# Patient Record
Sex: MALE | Race: WHITE | NOT HISPANIC OR LATINO | Employment: FULL TIME | URBAN - METROPOLITAN AREA
[De-identification: names, ages, dates, MRNs, and addresses within clinical notes are randomized per-mention and may not be internally consistent; named-entity substitution may affect disease eponyms.]

---

## 2017-09-18 PROBLEM — K63.5 POLYP OF COLON: Status: ACTIVE | Noted: 2017-09-18

## 2018-07-11 PROBLEM — K63.5 COLON POLYPS: Status: ACTIVE | Noted: 2017-09-18

## 2018-08-04 ENCOUNTER — OFFICE VISIT (OUTPATIENT)
Dept: URGENT CARE | Facility: CLINIC | Age: 58
End: 2018-08-04
Payer: COMMERCIAL

## 2018-08-04 VITALS
HEIGHT: 76 IN | DIASTOLIC BLOOD PRESSURE: 94 MMHG | SYSTOLIC BLOOD PRESSURE: 155 MMHG | WEIGHT: 285 LBS | RESPIRATION RATE: 16 BRPM | OXYGEN SATURATION: 97 % | HEART RATE: 97 BPM | BODY MASS INDEX: 34.7 KG/M2 | TEMPERATURE: 101 F

## 2018-08-04 DIAGNOSIS — J06.9 VIRAL URI WITH COUGH: ICD-10-CM

## 2018-08-04 DIAGNOSIS — R05.9 COUGH: Primary | ICD-10-CM

## 2018-08-04 LAB
CTP QC/QA: YES
FLUAV AG NPH QL: NEGATIVE
FLUBV AG NPH QL: NEGATIVE

## 2018-08-04 PROCEDURE — 96372 THER/PROPH/DIAG INJ SC/IM: CPT | Mod: S$GLB,,, | Performed by: PHYSICIAN ASSISTANT

## 2018-08-04 PROCEDURE — 87804 INFLUENZA ASSAY W/OPTIC: CPT | Mod: QW,S$GLB,, | Performed by: PHYSICIAN ASSISTANT

## 2018-08-04 PROCEDURE — 99214 OFFICE O/P EST MOD 30 MIN: CPT | Mod: 25,S$GLB,, | Performed by: PHYSICIAN ASSISTANT

## 2018-08-04 RX ORDER — BETAMETHASONE SODIUM PHOSPHATE AND BETAMETHASONE ACETATE 3; 3 MG/ML; MG/ML
6 INJECTION, SUSPENSION INTRA-ARTICULAR; INTRALESIONAL; INTRAMUSCULAR; SOFT TISSUE
Status: COMPLETED | OUTPATIENT
Start: 2018-08-04 | End: 2018-08-04

## 2018-08-04 RX ORDER — FLUTICASONE PROPIONATE 50 MCG
2 SPRAY, SUSPENSION (ML) NASAL DAILY
Qty: 1 BOTTLE | Refills: 2 | Status: SHIPPED | OUTPATIENT
Start: 2018-08-04 | End: 2018-12-15

## 2018-08-04 RX ORDER — BENZONATATE 100 MG/1
100 CAPSULE ORAL EVERY 6 HOURS PRN
Qty: 60 CAPSULE | Refills: 1 | Status: SHIPPED | OUTPATIENT
Start: 2018-08-04 | End: 2018-12-15

## 2018-08-04 RX ADMIN — BETAMETHASONE SODIUM PHOSPHATE AND BETAMETHASONE ACETATE 6 MG: 3; 3 INJECTION, SUSPENSION INTRA-ARTICULAR; INTRALESIONAL; INTRAMUSCULAR; SOFT TISSUE at 04:08

## 2018-08-04 NOTE — PROGRESS NOTES
"Subjective:       Patient ID: Ayo Kimball is a 58 y.o. male.    Vitals:  height is 6' 3.5" (1.918 m) and weight is 129.3 kg (285 lb). His oral temperature is 101.1 °F (38.4 °C) (abnormal). His blood pressure is 155/94 (abnormal) and his pulse is 97. His respiration is 16 and oxygen saturation is 97%.     Chief Complaint: Sinus Problem    Sinus Problem   This is a new problem. The current episode started yesterday. The problem has been gradually worsening since onset. The maximum temperature recorded prior to his arrival was 101 - 101.9 F. Associated symptoms include chills, congestion (nasal), coughing, headaches and a sore throat. Pertinent negatives include no ear pain, hoarse voice or shortness of breath.     Review of Systems   Constitution: Positive for chills, decreased appetite and malaise/fatigue. Negative for fever.   HENT: Positive for congestion (nasal) and sore throat. Negative for ear pain and hoarse voice.    Eyes: Negative for discharge and redness.   Cardiovascular: Negative for chest pain, dyspnea on exertion and leg swelling.   Respiratory: Positive for cough. Negative for shortness of breath, sputum production and wheezing.    Musculoskeletal: Positive for myalgias.   Gastrointestinal: Negative for abdominal pain and nausea.   Neurological: Positive for headaches.       Objective:      Physical Exam   Constitutional: He is oriented to person, place, and time. He appears well-developed and well-nourished. He is cooperative.  Non-toxic appearance. He does not appear ill. No distress.   HENT:   Head: Normocephalic and atraumatic.   Right Ear: Hearing, tympanic membrane, external ear and ear canal normal.   Left Ear: Hearing, tympanic membrane, external ear and ear canal normal.   Nose: Nose normal. No mucosal edema, rhinorrhea or nasal deformity. No epistaxis. Right sinus exhibits no maxillary sinus tenderness and no frontal sinus tenderness. Left sinus exhibits no maxillary sinus tenderness and no " frontal sinus tenderness.   Mouth/Throat: Uvula is midline and mucous membranes are normal. No trismus in the jaw. Normal dentition. No uvula swelling. Posterior oropharyngeal erythema present.   Eyes: Conjunctivae and lids are normal. No scleral icterus.   Sclera clear bilat   Neck: Trachea normal, full passive range of motion without pain and phonation normal. Neck supple.   Cardiovascular: Normal rate, regular rhythm, normal heart sounds, intact distal pulses and normal pulses.    Pulmonary/Chest: Effort normal and breath sounds normal. No respiratory distress.   Abdominal: Soft. Normal appearance and bowel sounds are normal. He exhibits no distension. There is no tenderness.   Musculoskeletal: Normal range of motion. He exhibits no edema or deformity.   Neurological: He is alert and oriented to person, place, and time. He exhibits normal muscle tone. Coordination normal.   Skin: Skin is warm, dry and intact. He is not diaphoretic. No pallor.   Psychiatric: He has a normal mood and affect. His speech is normal and behavior is normal. Judgment and thought content normal. Cognition and memory are normal.   Nursing note and vitals reviewed.      Assessment:       1. Cough    2. Viral URI with cough        Plan:         Cough  -     POCT Influenza A/B    Viral URI with cough    Other orders  -     betamethasone acetate-betamethasone sodium phosphate injection 6 mg; Inject 1 mL (6 mg total) into the muscle one time.  -     fluticasone (FLONASE ALLERGY RELIEF) 50 mcg/actuation nasal spray; 2 sprays (100 mcg total) by Each Nare route once daily.  Dispense: 1 Bottle; Refill: 2  -     benzonatate (TESSALON PERLES) 100 MG capsule; Take 1 capsule (100 mg total) by mouth every 6 (six) hours as needed.  Dispense: 60 capsule; Refill: 1       I discussed with the patient the importance of follow up if their symptoms have not resolved in 1-2 week's time. Patient verbalized understanding and will RTC or go to the nearest ER if  symptoms persist or worsen.

## 2018-08-07 ENCOUNTER — TELEPHONE (OUTPATIENT)
Dept: URGENT CARE | Facility: CLINIC | Age: 58
End: 2018-08-07

## 2018-08-10 PROBLEM — J18.9 PNEUMONIA OF RIGHT LOWER LOBE DUE TO INFECTIOUS ORGANISM: Status: ACTIVE | Noted: 2018-08-10

## 2018-12-15 ENCOUNTER — OFFICE VISIT (OUTPATIENT)
Dept: URGENT CARE | Facility: CLINIC | Age: 58
End: 2018-12-15
Payer: COMMERCIAL

## 2018-12-15 VITALS
SYSTOLIC BLOOD PRESSURE: 139 MMHG | HEART RATE: 71 BPM | OXYGEN SATURATION: 98 % | TEMPERATURE: 98 F | DIASTOLIC BLOOD PRESSURE: 80 MMHG | RESPIRATION RATE: 14 BRPM

## 2018-12-15 DIAGNOSIS — R09.81 SINUS CONGESTION: Primary | ICD-10-CM

## 2018-12-15 PROCEDURE — 99214 OFFICE O/P EST MOD 30 MIN: CPT | Mod: 25,S$GLB,, | Performed by: PHYSICIAN ASSISTANT

## 2018-12-15 PROCEDURE — 96372 THER/PROPH/DIAG INJ SC/IM: CPT | Mod: S$GLB,,, | Performed by: FAMILY MEDICINE

## 2018-12-15 RX ORDER — PROMETHAZINE HYDROCHLORIDE AND DEXTROMETHORPHAN HYDROBROMIDE 6.25; 15 MG/5ML; MG/5ML
5 SYRUP ORAL NIGHTLY PRN
Qty: 180 ML | Refills: 1 | Status: SHIPPED | OUTPATIENT
Start: 2018-12-15 | End: 2018-12-25

## 2018-12-15 RX ORDER — BETAMETHASONE SODIUM PHOSPHATE AND BETAMETHASONE ACETATE 3; 3 MG/ML; MG/ML
6 INJECTION, SUSPENSION INTRA-ARTICULAR; INTRALESIONAL; INTRAMUSCULAR; SOFT TISSUE
Status: COMPLETED | OUTPATIENT
Start: 2018-12-15 | End: 2018-12-15

## 2018-12-15 RX ORDER — FLUTICASONE PROPIONATE 50 MCG
1 SPRAY, SUSPENSION (ML) NASAL 2 TIMES DAILY PRN
Qty: 1 BOTTLE | Refills: 1 | Status: SHIPPED | OUTPATIENT
Start: 2018-12-15 | End: 2019-08-02

## 2018-12-15 RX ORDER — BENZONATATE 200 MG/1
200 CAPSULE ORAL 3 TIMES DAILY PRN
Qty: 60 CAPSULE | Refills: 1 | Status: SHIPPED | OUTPATIENT
Start: 2018-12-15 | End: 2018-12-25

## 2018-12-15 RX ADMIN — BETAMETHASONE SODIUM PHOSPHATE AND BETAMETHASONE ACETATE 6 MG: 3; 3 INJECTION, SUSPENSION INTRA-ARTICULAR; INTRALESIONAL; INTRAMUSCULAR; SOFT TISSUE at 02:12

## 2018-12-15 NOTE — PROGRESS NOTES
Subjective:       Patient ID: Ayo Kimball is a 58 y.o. male.    Vitals:  oral temperature is 97.7 °F (36.5 °C). His blood pressure is 139/80 and his pulse is 71. His respiration is 14 and oxygen saturation is 98%.     Chief Complaint: Cough    Pt c/o productive cough, 1 day, nasal congestion, post nasal drip, sore throat, hoarseness, fever symptoms unmeasured at home, ear congestion, taking tylenol with mild relief, pt stated he was dx with pneumonia 3 months ago,       Cough   This is a new problem. The current episode started yesterday. The problem has been unchanged. The problem occurs constantly. The cough is productive of sputum. Associated symptoms include ear congestion, a fever, nasal congestion, postnasal drip and a sore throat. Pertinent negatives include no chills, ear pain, eye redness, hemoptysis, myalgias, rash, shortness of breath or wheezing. Treatments tried: Tylenol  The treatment provided mild relief. His past medical history is significant for pneumonia.       Constitution: Positive for fatigue and fever. Negative for chills and sweating.   HENT: Positive for congestion, postnasal drip and sore throat. Negative for ear pain, sinus pain, sinus pressure and voice change.    Neck: Negative for painful lymph nodes.   Eyes: Negative for eye redness.   Respiratory: Positive for cough and sputum production. Negative for chest tightness, bloody sputum, COPD, shortness of breath, stridor, wheezing and asthma.    Gastrointestinal: Negative for nausea and vomiting.   Musculoskeletal: Negative for muscle ache.   Skin: Negative for rash.   Allergic/Immunologic: Negative for seasonal allergies and asthma.   Hematologic/Lymphatic: Negative for swollen lymph nodes.       Objective:      Physical Exam   Constitutional: He is oriented to person, place, and time. He appears well-developed and well-nourished. He is cooperative.  Non-toxic appearance. He appears ill. No distress.   HENT:   Head: Normocephalic and  atraumatic.   Right Ear: Hearing, tympanic membrane, external ear and ear canal normal.   Left Ear: Hearing, tympanic membrane, external ear and ear canal normal.   Nose: Nose normal. No mucosal edema, rhinorrhea or nasal deformity. No epistaxis. Right sinus exhibits no maxillary sinus tenderness and no frontal sinus tenderness. Left sinus exhibits no maxillary sinus tenderness and no frontal sinus tenderness.   Mouth/Throat: Uvula is midline, oropharynx is clear and moist and mucous membranes are normal. No trismus in the jaw. Normal dentition. No uvula swelling. No posterior oropharyngeal erythema.   Eyes: Conjunctivae and lids are normal. No scleral icterus.   Sclera clear bilat   Neck: Trachea normal, full passive range of motion without pain and phonation normal. Neck supple.   Cardiovascular: Normal rate, regular rhythm, normal heart sounds, intact distal pulses and normal pulses.   Pulmonary/Chest: Effort normal and breath sounds normal. No respiratory distress.   Abdominal: Soft. Normal appearance and bowel sounds are normal. He exhibits no distension. There is no tenderness.   Musculoskeletal: Normal range of motion. He exhibits no edema or deformity.   Neurological: He is alert and oriented to person, place, and time. He exhibits normal muscle tone. Coordination normal.   Skin: Skin is warm, dry and intact. He is not diaphoretic. No pallor.   Psychiatric: He has a normal mood and affect. His speech is normal and behavior is normal. Judgment and thought content normal. Cognition and memory are normal.   Nursing note and vitals reviewed.      Assessment:       1. Sinus congestion        Plan:         Sinus congestion  -     betamethasone acetate-betamethasone sodium phosphate injection 6 mg  -     benzonatate (TESSALON) 200 MG capsule; Take 1 capsule (200 mg total) by mouth 3 (three) times daily as needed for Cough.  Dispense: 60 capsule; Refill: 1  -     fluticasone (FLONASE) 50 mcg/actuation nasal spray; 1  "spray (50 mcg total) by Each Nare route 2 (two) times daily as needed for Rhinitis or Allergies.  Dispense: 1 Bottle; Refill: 1  -     sodium chloride (OCEAN NASAL) 0.65 % nasal spray; 1 spray by Nasal route as needed for Congestion.  Dispense: 45 mL; Refill: 3  -     promethazine-dextromethorphan (PROMETHAZINE-DM) 6.25-15 mg/5 mL Syrp; Take 5 mLs by mouth nightly as needed.  Dispense: 180 mL; Refill: 1      Patient Instructions   NASAL ALLERGY    Nasal Allergy, also called "Allergic Rhinitis" occurs after exposure to pollen, molds, mildew, animal "dander" (scales from animal skin, hair and feathers), dust, smoke and fumes. (These are called "allergens"). When pollen causes a nasal allergy it is commonly called "Hay Fever".    When these particles contact the lining of the nose, eyes, eyelids, sinuses or throat, they cause the cells to release a chemical called "histamine". Histamine may cause a watery discharge from the eyes or nose. It may also cause violent sneezing, nasal congestion, itching of the eyes, nose, throat and mouth.    PREVENTION:    Nasal allergy cannot be cured but symptoms can be reduced. Avoid or reduce exposure to the allergen when possible.    HOME CARE:    1) DECONGESTANT pills and sprays (Sudafed, NeoSynephrine), reduce tissue swelling and watery discharge. Overuse of nasal decongestant sprays may make symptoms worse, ESPECIALLY IF YOU HAVE HIGH BLOOD PRESSURE. Do not use these more often than recommended. Get an over the counter Nasal Saline spray to supplement Flonase    2) ANTIHISTAMINES block the release of histamine during the allergic response. Antihistamines are more effective when taken BEFORE symptoms develop. Unless a prescription antihistamine was prescribed, you may take CLARITIN (loratadine). (Claritin is an over-the-counter antihistamine that does not cause drowsiness.)    3) STEROID nasal sprays (Beconase, Vancenase, Nasalide, Nasocort, Flonase) or oral steroids (Prednisone) " may also be prescribed for more severe symptoms. These help to reduce the local inflammation which adds to the allergic response.    4) If you have ASTHMA, pollen season may make your asthma symptoms worse. It is important that you use your asthma medicines as directed during this time to prevent or treat attacks. Some persons with asthma have a worsening of their asthma symptoms when taking antihistamines. If you notice this, stop the antihistamines and notify your doctor.    5) Consider a [place] Rhino Nasal and Sinus Rinse 2-3 times/week if your symptoms are chronic. ([email or web address]://[email or web address].[email or web address])     You received a steroid shot today - this can elevate your blood pressure, elevate your blood sugar, water weight gain, nervous energy, redness to the face and dimpling of the skin where the shot goes in.     If you were prescribed a narcotic medication, do not drive or operate heavy equipment or machinery while taking these medications.  You must understand that you've received an Urgent Care treatment only and that you may be released before all your medical problems are known or treated. You, the patient, will arrange for follow up care as instructed.  Follow up with your PCP or specialty clinic as directed in the next 1-2 weeks if not improved or as needed.  You can call [phone] to schedule an appointment with the appropriate provider.  If your condition worsens we recommend that you receive another evaluation at the emergency room immediately or contact your primary medical clinics after hours call service to discuss your concerns.  Please return here or go to the Emergency Department for any concerns or worsening of condition.

## 2018-12-15 NOTE — PATIENT INSTRUCTIONS
"NASAL ALLERGY    Nasal Allergy, also called "Allergic Rhinitis" occurs after exposure to pollen, molds, mildew, animal "dander" (scales from animal skin, hair and feathers), dust, smoke and fumes. (These are called "allergens"). When pollen causes a nasal allergy it is commonly called "Hay Fever".    When these particles contact the lining of the nose, eyes, eyelids, sinuses or throat, they cause the cells to release a chemical called "histamine". Histamine may cause a watery discharge from the eyes or nose. It may also cause violent sneezing, nasal congestion, itching of the eyes, nose, throat and mouth.    PREVENTION:    Nasal allergy cannot be cured but symptoms can be reduced. Avoid or reduce exposure to the allergen when possible.    HOME CARE:    1) DECONGESTANT pills and sprays (Sudafed, NeoSynephrine), reduce tissue swelling and watery discharge. Overuse of nasal decongestant sprays may make symptoms worse, ESPECIALLY IF YOU HAVE HIGH BLOOD PRESSURE. Do not use these more often than recommended. Get an over the counter Nasal Saline spray to supplement Flonase    2) ANTIHISTAMINES block the release of histamine during the allergic response. Antihistamines are more effective when taken BEFORE symptoms develop. Unless a prescription antihistamine was prescribed, you may take CLARITIN (loratadine). (Claritin is an over-the-counter antihistamine that does not cause drowsiness.)    3) STEROID nasal sprays (Beconase, Vancenase, Nasalide, Nasocort, Flonase) or oral steroids (Prednisone) may also be prescribed for more severe symptoms. These help to reduce the local inflammation which adds to the allergic response.    4) If you have ASTHMA, pollen season may make your asthma symptoms worse. It is important that you use your asthma medicines as directed during this time to prevent or treat attacks. Some persons with asthma have a worsening of their asthma symptoms when taking antihistamines. If you notice this, stop " the antihistamines and notify your doctor.    5) Consider a Chitto Rhino Nasal and Sinus Rinse 2-3 times/week if your symptoms are chronic. ([email or web address]://[email or web address].[email or web address])     You received a steroid shot today - this can elevate your blood pressure, elevate your blood sugar, water weight gain, nervous energy, redness to the face and dimpling of the skin where the shot goes in.     If you were prescribed a narcotic medication, do not drive or operate heavy equipment or machinery while taking these medications.  You must understand that you've received an Urgent Care treatment only and that you may be released before all your medical problems are known or treated. You, the patient, will arrange for follow up care as instructed.  Follow up with your PCP or specialty clinic as directed in the next 1-2 weeks if not improved or as needed.  You can call [phone] to schedule an appointment with the appropriate provider.  If your condition worsens we recommend that you receive another evaluation at the emergency room immediately or contact your primary medical clinics after hours call service to discuss your concerns.  Please return here or go to the Emergency Department for any concerns or worsening of condition.

## 2019-01-18 PROBLEM — J18.9 PNEUMONIA OF RIGHT LOWER LOBE DUE TO INFECTIOUS ORGANISM: Status: RESOLVED | Noted: 2018-08-10 | Resolved: 2019-01-18

## 2019-06-09 PROBLEM — K63.5 POLYP OF COLON: Status: RESOLVED | Noted: 2017-09-18 | Resolved: 2019-06-09

## 2019-06-09 PROBLEM — K63.5 COLON POLYPS: Status: RESOLVED | Noted: 2017-09-18 | Resolved: 2019-06-09

## 2019-10-11 ENCOUNTER — OFFICE VISIT (OUTPATIENT)
Dept: ORTHOPEDICS | Facility: CLINIC | Age: 59
End: 2019-10-11
Payer: COMMERCIAL

## 2019-10-11 VITALS
WEIGHT: 282 LBS | HEIGHT: 76 IN | SYSTOLIC BLOOD PRESSURE: 139 MMHG | HEART RATE: 69 BPM | DIASTOLIC BLOOD PRESSURE: 83 MMHG | BODY MASS INDEX: 34.34 KG/M2

## 2019-10-11 DIAGNOSIS — M25.512 ACUTE PAIN OF LEFT SHOULDER: Primary | ICD-10-CM

## 2019-10-11 DIAGNOSIS — M75.52 SUBACROMIAL BURSITIS OF LEFT SHOULDER JOINT: ICD-10-CM

## 2019-10-11 PROCEDURE — 99203 PR OFFICE/OUTPT VISIT, NEW, LEVL III, 30-44 MIN: ICD-10-PCS | Mod: 25,S$GLB,, | Performed by: PHYSICIAN ASSISTANT

## 2019-10-11 PROCEDURE — 99203 OFFICE O/P NEW LOW 30 MIN: CPT | Mod: 25,S$GLB,, | Performed by: PHYSICIAN ASSISTANT

## 2019-10-11 PROCEDURE — 20610 DRAIN/INJ JOINT/BURSA W/O US: CPT | Mod: LT,S$GLB,, | Performed by: PHYSICIAN ASSISTANT

## 2019-10-11 PROCEDURE — 20610 LARGE JOINT ASPIRATION/INJECTION: L SUBACROMIAL BURSA: ICD-10-PCS | Mod: LT,S$GLB,, | Performed by: PHYSICIAN ASSISTANT

## 2019-10-11 RX ORDER — METHYLPREDNISOLONE ACETATE 40 MG/ML
40 INJECTION, SUSPENSION INTRA-ARTICULAR; INTRALESIONAL; INTRAMUSCULAR; SOFT TISSUE
Status: DISCONTINUED | OUTPATIENT
Start: 2019-10-11 | End: 2019-10-11 | Stop reason: HOSPADM

## 2019-10-11 RX ADMIN — METHYLPREDNISOLONE ACETATE 40 MG: 40 INJECTION, SUSPENSION INTRA-ARTICULAR; INTRALESIONAL; INTRAMUSCULAR; SOFT TISSUE at 09:10

## 2019-10-11 NOTE — PROGRESS NOTES
"Subjective:       Patient ID: Ayo Kimball is a 59 y.o. male.    Chief Complaint: Pain of the Left Shoulder (Left shoulder pain x a while. States that he has had injections in the past that worse. Pain has been for a month. States that it is hard to lift anything with that arm. )      History of Present Illness  Patient is here for initial evaluation chief complaint left shoulder pain for about a month. Had a previous similar episode about almost 15 years ago that resolved with a corticosteroid injection. Current pain limits functional mobility and range of motion.    Current Medications  Current Outpatient Medications   Medication Sig Dispense Refill    aspirin (ECOTRIN) 81 MG EC tablet Take 81 mg by mouth once daily. LEILA LOW DOSE 81 MG TBEC      cholecalciferol, vitamin D3, (VITAMIN D3) 2,000 unit Tab Take 1 tablet (2,000 Units total) by mouth once daily.      flaxseed oil 1,000 mg Cap Take 2,000 mg by mouth 2 (two) times daily.      FREESTYLE LITE METER kit USE AS DIRECTED to check BLOOD SUGAR  0    FREESTYLE LITE STRIPS Strp USE AS DIRECTED to test BLOOD SUGAR 3 TIMES DAILY 250 strip 3    insulin (LANTUS SOLOSTAR U-100 INSULIN) glargine 100 units/mL (3mL) SubQ pen Inject 70 Units into the skin every evening 75 mL 3    lancets (FREESTYLE LANCETS) 28 gauge Misc 1 lancet by Other route 2 (two) times daily before meals. 200 each 3    losartan (COZAAR) 25 MG tablet TAKE 1 TABLET BY MOUTH once daily 90 tablet 1    metFORMIN (GLUCOPHAGE) 1000 MG tablet Take 1 tablet (1,000 mg total) by mouth 2 (two) times daily. 180 tablet 3    omeprazole (PRILOSEC) 40 MG capsule Take 1 capsule (40 mg total) by mouth once daily. 90 capsule 3    pen needle, diabetic (NOVOTWIST) 32 gauge x 1/5" Ndle 1 each by Other route 2 (two) times daily before meals. 200 each 3    pravastatin (PRAVACHOL) 40 MG tablet Take 1 tablet (40 mg total) by mouth once daily. 90 tablet 3    tadalafil (CIALIS) 20 MG Tab Take 1 tablet (20 mg total) " "by mouth as needed. 88 tablet 6    losartan (COZAAR) 100 MG tablet Take 1 tablet (100 mg total) by mouth once daily. (Patient not taking: Reported on 10/11/2019) 90 tablet 3     No current facility-administered medications for this visit.        Allergies  Review of patient's allergies indicates:   Allergen Reactions    Exenatide microspheres      Other reaction(s): rash    Lisinopril      Other reaction(s): Facial angioedema., Facial angioedema.       Past Medical History  Past Medical History:   Diagnosis Date    a H/O Facial Angioedema From Lisinopril     But He Tolerates Losartan Well    b Hypertension     b Microalbuminuria     On Losartan 25 Mg Daily    c Hypercholesterolemia With Low HDL     3/24/17 Changed OTC FO To OTC Flaxseed Oil 2K Mg Bid; On Prevastatin Mg qHS    c Mild-To-Moderate Hypertriglyceridemia     d Type 2 DM On Insulin     ** 11/17/17 Referred DM EDU; 10/6/17 On 70 Units Lantus SQ qHS; 8/12/16 Increased 60 Units Lantus To 70 Units SQ qHS; Has Had DM EDU With Heather And With His (Dietician) Daughter-In-Law    i H/O RLL Pneumonia In 08/2018     [facility] 8/10/19-8/12/19 Stay For This    j GERD     Dr. Ender Pearson    j H/O Adenomatous Colon Polyps On 9/18/17 TC     Dr. Ender Pearson: "Repeat TC In 'x' Years"    j H/O Appendectomy     j Sigmoid Diverticulosis With H/O Diverticulitis     Dr. Ender Pearson    o H/O Nasal Septoplasty     Pneumonia of right lower lobe due to infectious organism 8/10/2018    q Borderline Vitamin D Deficiency     6/9/19 RXd OTC D3 2K IU Daily    Wellness Visit 8/2/19        Surgical History  Past Surgical History:   Procedure Laterality Date    APPENDECTOMY      COLONOSCOPY N/A 9/18/2017    Procedure: COLONOSCOPY;  Surgeon: Ender Pearson MD;  Location: [facility];  Service: Endoscopy;  Laterality: N/A;    NOSE SURGERY         Family History:   Family History   Problem Relation Age of Onset    No Known Problems Mother     Prostate cancer " Father        Social History:   Social History     Socioeconomic History    Marital status:      Spouse name: Jay Lopez    Number of children: 3    Years of education: Not on file    Highest education level: Not on file   Occupational History    Not on file   Social Needs    Financial resource strain: Not on file    Food insecurity:     Worry: Not on file     Inability: Not on file    Transportation needs:     Medical: Not on file     Non-medical: Not on file   Tobacco Use    Smoking status: Never Smoker    Smokeless tobacco: Never Used   Substance and Sexual Activity    Alcohol use: Yes     Alcohol/week: 1.0 standard drinks     Types: 1 Cans of beer per week     Comment: socially     Drug use: No    Sexual activity: Yes     Partners: Female   Lifestyle    Physical activity:     Days per week: Not on file     Minutes per session: Not on file    Stress: Not on file   Relationships    Social connections:     Talks on phone: Not on file     Gets together: Not on file     Attends [employer] service: Not on file     Active member of club or organization: Not on file     Attends meetings of clubs or organizations: Not on file     Relationship status: Not on file   Other Topics Concern    Not on file   Social History Narrative    Not on file       Hospitalization/Major Diagnostic Procedure:     Review of Systems     General/Constitutional:  Chills denies. Fatigue denies. Fever denies. Weight gain denies. Weight loss denies.    Respiratory:  Shortness of breath denies.    Cardiovascular:  Chest pain denies.    Gastrointestinal:  Constipation denies. Diarrhea denies. Nausea denies. Vomiting denies.     Hematology:  Easy bruising denies. Prolonged bleeding denies.     Genitourinary:  Frequent urination denies. Pain in lower back denies. Painful urination denies.     Musculoskeletal:  See HPI for details    Skin:  Rash denies.    Neurologic:  Dizziness denies. Gait abnormalities denies. Seizures denies.  Tingling/Numbess denies.    Psychiatric:  Anxiety denies. Depressed mood denies.     Objective:   Vital Signs:   Vitals:    10/11/19 0904   BP: 139/83   Pulse: 69        Physical Exam      General Examination:     Constitutional: The patient is alert and oriented to lace person and time. Mood is pleasant.     Head/Face: Normal facial features normal eyebrows    Eyes: Normal extraocular motion bilaterally    Lungs: Respirations are equal and unlabored    Gait is coordinated.    Cardiovascular: There are no swelling or varicosities present.    Lymphatic: Negative for adenopathy    Skin: Normal    Neurological: Level of consciousness normal. Oriented to place person and time and situation    Psychiatric: Oriented to time place person and situation    Left shoulder exam: Tenderness palpation over the acromioclavicular joint and the lateral deltoid. Limited active range of motion in all planes secondary to pain and guarding. Positive Neer's and positive [place].    XRAY Report/ Interpretation : Left shoulder x-rays AP and axillary view taken in the office today reviewed the patient demonstrate mild to moderate degenerative changes of the acromioclavicular joint      Assessment:       1. Acute pain of left shoulder    2. Subacromial bursitis of left shoulder joint        Plan:       Ayo was seen today for pain.    Diagnoses and all orders for this visit:    Acute pain of left shoulder  -     X-Ray Shoulder 2 or More Views Left    Subacromial bursitis of left shoulder joint         No follow-ups on file.    Patient was administered a left shoulder subacromial injection with 80 mg Depo-Medrol. He tolerated the procedure well. Please see procedure. For details. If pain persists greater than 2 weeks then I recommended the patient call me and I will order an MRI of the left shoulder. Consider physical therapy as well    This note was created using Dragon voice recognition software that occasionally misinterpreted phrases or  words.

## 2019-10-11 NOTE — PROCEDURES
Large Joint Aspiration/Injection: L subacromial bursa  Date/Time: 10/11/2019 9:00 AM  Performed by: PAULIE Barclay  Authorized by: PAULIE Barclay     Consent Done?:  Yes (Verbal)  Indications:  Pain  Procedure site marked: Yes    Timeout: Prior to procedure the correct patient, procedure, and site was verified    Anesthesia  Local anesthesia used  Anesthetic: lidocaine 1% without epinephrine    Location:  Shoulder  Site:  L subacromial bursa  Prep: Patient was prepped and draped in usual sterile fashion    Needle size:  25 G  Medications:  40 mg methylPREDNISolone acetate 40 mg/mL; 40 mg methylPREDNISolone acetate 40 mg/mL  Patient tolerance:  Patient tolerated the procedure well with no immediate complications

## 2020-08-14 PROBLEM — M19.012 LOCALIZED OSTEOARTHRITIS OF LEFT SHOULDER: Status: ACTIVE | Noted: 2020-08-14

## 2021-06-19 PROBLEM — K61.1 RECTAL ABSCESS: Status: ACTIVE | Noted: 2021-06-19

## 2021-06-21 ENCOUNTER — TELEPHONE (OUTPATIENT)
Dept: SURGERY | Facility: CLINIC | Age: 61
End: 2021-06-21

## 2021-06-28 ENCOUNTER — OFFICE VISIT (OUTPATIENT)
Dept: SURGERY | Facility: CLINIC | Age: 61
End: 2021-06-28
Payer: COMMERCIAL

## 2021-06-28 VITALS
HEART RATE: 78 BPM | BODY MASS INDEX: 34.01 KG/M2 | WEIGHT: 279.31 LBS | SYSTOLIC BLOOD PRESSURE: 142 MMHG | DIASTOLIC BLOOD PRESSURE: 81 MMHG | HEIGHT: 76 IN

## 2021-06-28 DIAGNOSIS — K60.3 ANAL FISTULA: Primary | ICD-10-CM

## 2021-06-28 DIAGNOSIS — K61.2 ABSCESS OF ANAL AND RECTAL REGIONS: Primary | ICD-10-CM

## 2021-06-28 DIAGNOSIS — K60.3 ANAL FISTULA: ICD-10-CM

## 2021-06-28 PROCEDURE — 99203 PR OFFICE/OUTPT VISIT, NEW, LEVL III, 30-44 MIN: ICD-10-PCS | Mod: S$GLB,,, | Performed by: COLON & RECTAL SURGERY

## 2021-06-28 PROCEDURE — 99203 OFFICE O/P NEW LOW 30 MIN: CPT | Mod: S$GLB,,, | Performed by: COLON & RECTAL SURGERY

## 2021-06-28 PROCEDURE — 99999 PR PBB SHADOW E&M-EST. PATIENT-LVL III: CPT | Mod: PBBFAC,,, | Performed by: COLON & RECTAL SURGERY

## 2021-06-28 PROCEDURE — 99999 PR PBB SHADOW E&M-EST. PATIENT-LVL III: ICD-10-PCS | Mod: PBBFAC,,, | Performed by: COLON & RECTAL SURGERY

## 2021-07-13 ENCOUNTER — TELEPHONE (OUTPATIENT)
Dept: SURGERY | Facility: CLINIC | Age: 61
End: 2021-07-13

## 2021-07-26 ENCOUNTER — OFFICE VISIT (OUTPATIENT)
Dept: SURGERY | Facility: CLINIC | Age: 61
End: 2021-07-26
Payer: COMMERCIAL

## 2021-07-26 VITALS
DIASTOLIC BLOOD PRESSURE: 80 MMHG | HEART RATE: 72 BPM | WEIGHT: 275.56 LBS | SYSTOLIC BLOOD PRESSURE: 127 MMHG | BODY MASS INDEX: 33.56 KG/M2 | HEIGHT: 76 IN

## 2021-07-26 DIAGNOSIS — K60.3 ANAL FISTULA: Primary | ICD-10-CM

## 2021-07-26 PROCEDURE — 99999 PR PBB SHADOW E&M-EST. PATIENT-LVL III: CPT | Mod: PBBFAC,,, | Performed by: COLON & RECTAL SURGERY

## 2021-07-26 PROCEDURE — 99024 PR POST-OP FOLLOW-UP VISIT: ICD-10-PCS | Mod: S$GLB,,, | Performed by: COLON & RECTAL SURGERY

## 2021-07-26 PROCEDURE — 99024 POSTOP FOLLOW-UP VISIT: CPT | Mod: S$GLB,,, | Performed by: COLON & RECTAL SURGERY

## 2021-07-26 PROCEDURE — 99999 PR PBB SHADOW E&M-EST. PATIENT-LVL III: ICD-10-PCS | Mod: PBBFAC,,, | Performed by: COLON & RECTAL SURGERY

## 2021-08-27 PROBLEM — K60.3 ANAL FISTULA: Status: RESOLVED | Noted: 2021-06-28 | Resolved: 2021-08-27

## 2021-08-27 PROBLEM — K61.1 RECTAL ABSCESS: Status: RESOLVED | Noted: 2021-06-19 | Resolved: 2021-08-27

## 2022-04-04 ENCOUNTER — OFFICE VISIT (OUTPATIENT)
Dept: SURGERY | Facility: CLINIC | Age: 62
End: 2022-04-04
Payer: COMMERCIAL

## 2022-04-04 VITALS
OXYGEN SATURATION: 98 % | DIASTOLIC BLOOD PRESSURE: 77 MMHG | WEIGHT: 295.19 LBS | HEART RATE: 61 BPM | BODY MASS INDEX: 35.93 KG/M2 | SYSTOLIC BLOOD PRESSURE: 125 MMHG

## 2022-04-04 DIAGNOSIS — K60.0 ACUTE ANAL FISSURE: Primary | ICD-10-CM

## 2022-04-04 PROCEDURE — 99999 PR PBB SHADOW E&M-EST. PATIENT-LVL IV: CPT | Mod: PBBFAC,,, | Performed by: COLON & RECTAL SURGERY

## 2022-04-04 PROCEDURE — 3074F PR MOST RECENT SYSTOLIC BLOOD PRESSURE < 130 MM HG: ICD-10-PCS | Mod: CPTII,S$GLB,, | Performed by: COLON & RECTAL SURGERY

## 2022-04-04 PROCEDURE — 99213 OFFICE O/P EST LOW 20 MIN: CPT | Mod: S$GLB,,, | Performed by: COLON & RECTAL SURGERY

## 2022-04-04 PROCEDURE — 3078F PR MOST RECENT DIASTOLIC BLOOD PRESSURE < 80 MM HG: ICD-10-PCS | Mod: CPTII,S$GLB,, | Performed by: COLON & RECTAL SURGERY

## 2022-04-04 PROCEDURE — 3078F DIAST BP <80 MM HG: CPT | Mod: CPTII,S$GLB,, | Performed by: COLON & RECTAL SURGERY

## 2022-04-04 PROCEDURE — 1159F PR MEDICATION LIST DOCUMENTED IN MEDICAL RECORD: ICD-10-PCS | Mod: CPTII,S$GLB,, | Performed by: COLON & RECTAL SURGERY

## 2022-04-04 PROCEDURE — 1159F MED LIST DOCD IN RCRD: CPT | Mod: CPTII,S$GLB,, | Performed by: COLON & RECTAL SURGERY

## 2022-04-04 PROCEDURE — 99213 PR OFFICE/OUTPT VISIT, EST, LEVL III, 20-29 MIN: ICD-10-PCS | Mod: S$GLB,,, | Performed by: COLON & RECTAL SURGERY

## 2022-04-04 PROCEDURE — 3008F PR BODY MASS INDEX (BMI) DOCUMENTED: ICD-10-PCS | Mod: CPTII,S$GLB,, | Performed by: COLON & RECTAL SURGERY

## 2022-04-04 PROCEDURE — 3008F BODY MASS INDEX DOCD: CPT | Mod: CPTII,S$GLB,, | Performed by: COLON & RECTAL SURGERY

## 2022-04-04 PROCEDURE — 4010F PR ACE/ARB THEARPY RXD/TAKEN: ICD-10-PCS | Mod: CPTII,S$GLB,, | Performed by: COLON & RECTAL SURGERY

## 2022-04-04 PROCEDURE — 3074F SYST BP LT 130 MM HG: CPT | Mod: CPTII,S$GLB,, | Performed by: COLON & RECTAL SURGERY

## 2022-04-04 PROCEDURE — 4010F ACE/ARB THERAPY RXD/TAKEN: CPT | Mod: CPTII,S$GLB,, | Performed by: COLON & RECTAL SURGERY

## 2022-04-04 PROCEDURE — 99999 PR PBB SHADOW E&M-EST. PATIENT-LVL IV: ICD-10-PCS | Mod: PBBFAC,,, | Performed by: COLON & RECTAL SURGERY

## 2022-04-04 NOTE — PROGRESS NOTES
Patient seen in follow-up. He had a perianal fistula which required fistulotomy. He states that two months ago he began having a pressure sensation intermittently. He states that this happens primarily after having a bowel movement. He states that when he takes Metamucil it feels better. He takes Metamucil every other day. He denies any bleeding. Denies drainage. Denies fever.

## 2022-04-04 NOTE — PROGRESS NOTES
"HPI:  Ayo Kimball is a 61 y.o. male with history of perianal fistula s/p fistulotomy    Interval hx    Patient seen in follow-up. He had a perianal fistula which required fistulotomy. He states that two months ago he began having a pressure sensation intermittently. He states that this happens primarily after having a bowel movement. He states that when he takes Metamucil it feels better. He takes Metamucil every other day. He denies any bleeding. Denies drainage.  No fever.           Past Medical History:   Diagnosis Date    a Elevated Cardiac CT Calcium Score     9/27/21 Referred To Dr. Ham Townsend; On ASA 81 Mg Daily; 9/23/21 Cardiac CT Ca+ Score = 351.5 (See Report)     a H/O Facial Angioedema From Lisinopril ###    But He Tolerates Losartan Well    b Hypertension     b Microalbuminuria     On Losartan 25 Mg Daily    c Hypercholesterolemia With Low HDL     His Goal LDL Is < 70 (CT Ca+ And DM2); 9/27/21 RXd Crestor 20 Mg qHS And D/Cd Pravastatin (Was Ineffective) With Labs In 6 Weeks; 3/24/17 Changed OTC FO To OTC Flaxseed Oil 2K Mg Bid; On Prevastatin Mg qHS    c Mild-To-Moderate Hypertriglyceridemia     d Type 2 Diabetes Mellitus On Insulin     11/17/17 Referred DM EDU; 10/6/17 On 70 Units Lantus SQ qHS; 8/12/16 Increased 60 Units Lantus To 70 Units SQ qHS; Has Had DM EDU With Heather And With His (Dietician) Daughter-In-Law    i H/O RLL Pneumonia In 08/2018     [facility] 8/10/19-8/12/19 Stay For This    j GERD     Dr. Ender sandhu H/O Adenomatous Colon Polyps On 9/18/17 TC ###    Dr. Ender Pearson: "Repeat TC In 'x' Years"    j H/O Anal Fissure Repair In 07/2021     Dr. Raúl sandhu H/O Appendectomy     j Sigmoid Diverticulosis With H/O Diverticulitis     Dr. Ender lopez Benign Prostatic Hyperplasia     Dr. Júnior Adames    o H/O Nasal Septoplasty     q Borderline Vitamin D Deficiency     6/9/19 RXd OTC D3 2K IU Daily    Wellness Visit 11/6/2020         Past Surgical " History:   Procedure Laterality Date    APPENDECTOMY      COLONOSCOPY N/A 9/18/2017    Procedure: COLONOSCOPY;  Surgeon: Ender Pearson MD;  Location: [facility] ENDO;  Service: Endoscopy;  Laterality: N/A;    COLONOSCOPY N/A 11/10/2021    Procedure: COLONOSCOPY;  Surgeon: Mando Mcgarry MD;  Location: [facility] ENDO;  Service: Endoscopy;  Laterality: N/A;    INCISION AND DRAINAGE OF PERIRECTAL REGION N/A 6/19/2021    Procedure: INCISION AND DRAINAGE, PERIRECTAL REGION;  Surgeon: Sonia Rapp MD;  Location: [facility] OR;  Service: General;  Laterality: N/A;    NOSE SURGERY      RECTAL FISTULOTOMY N/A 7/9/2021    Procedure: FISTULOTOMY, RECTUM;  Surgeon: MAYITO Vann MD;  Location: [facility] OR;  Service: Colon and Rectal;  Laterality: N/A;       Review of patient's allergies indicates:   Allergen Reactions    Lisinopril      Other reaction(s): Facial angioedema., Facial angioedema.       Family History   Problem Relation Age of Onset    Hypertension Mother     Arthritis Mother     Prostate cancer Father        Social History     Socioeconomic History    Marital status:      Spouse name: Jay Lopez    Number of children: 3   Tobacco Use    Smoking status: Never Smoker    Smokeless tobacco: Never Used   Substance and Sexual Activity    Alcohol use: Yes     Alcohol/week: 1.0 standard drink     Types: 1 Cans of beer per week     Comment: socially     Drug use: No    Sexual activity: Yes     Partners: Female       ROS:  GENERAL: No fever, chills, fatigability or weight loss.  Integument: No rashes, redness, icterus  CHEST: Denies HALLMAN, cyanosis, wheezing, cough and sputum production.  CARDIOVASCULAR: Denies chest pain, PND, orthopnea or reduced exercise tolerance.  GI: Denies abd pain, dysphagia, nausea, vomiting, no hematemesis   : Denies burning on urination, no hematuria, no bacteriuria  MSK: No deformities, swelling, joint pain swelling  Neurologic: No HAs, seizures, weakness, paresthesias,  gait problems    PE:  General appearance well  /77 (BP Location: Left arm, Patient Position: Sitting, BP Method: Large (Automatic))   Pulse 61   Wt 133.9 kg (295 lb 3.1 oz)   SpO2 98%   BMI 35.93 kg/m²   Sclera/ Skin anicteric  AT NC EOMI  Neck supple trachea midline   Chest symmetric, nl excursion, no retractions, breathing comfortably  EXT - no CCE  Neuro:  Mood/ affect nl, alert and oriented x 3, moves all ext's, gait nl    Rectal  Inspection       Small posterior fissure  No tenderness on palpation      Assessment:  Small anal fissure  Anal pain    Plan:  Diltiazem cream BID  Metamucil daily  Soak in tub warm water BID  OV 6 weeks

## 2022-05-09 PROBLEM — E78.00 HYPERCHOLESTEROLEMIA: Status: ACTIVE | Noted: 2022-05-09

## 2022-05-09 PROBLEM — R74.8 ELEVATED CK: Status: ACTIVE | Noted: 2022-05-09

## 2022-05-09 PROBLEM — Z86.010 HISTORY OF ADENOMATOUS POLYP OF COLON: Status: ACTIVE | Noted: 2022-05-09

## 2022-05-16 ENCOUNTER — OFFICE VISIT (OUTPATIENT)
Dept: SURGERY | Facility: CLINIC | Age: 62
End: 2022-05-16
Payer: COMMERCIAL

## 2022-05-16 VITALS
DIASTOLIC BLOOD PRESSURE: 71 MMHG | BODY MASS INDEX: 35.98 KG/M2 | WEIGHT: 295.44 LBS | HEIGHT: 76 IN | HEART RATE: 69 BPM | SYSTOLIC BLOOD PRESSURE: 142 MMHG

## 2022-05-16 DIAGNOSIS — K60.1 CHRONIC ANAL FISSURE: Primary | ICD-10-CM

## 2022-05-16 DIAGNOSIS — K59.00 CONSTIPATION, UNSPECIFIED CONSTIPATION TYPE: ICD-10-CM

## 2022-05-16 PROCEDURE — 1159F PR MEDICATION LIST DOCUMENTED IN MEDICAL RECORD: ICD-10-PCS | Mod: CPTII,S$GLB,, | Performed by: COLON & RECTAL SURGERY

## 2022-05-16 PROCEDURE — 3077F PR MOST RECENT SYSTOLIC BLOOD PRESSURE >= 140 MM HG: ICD-10-PCS | Mod: CPTII,S$GLB,, | Performed by: COLON & RECTAL SURGERY

## 2022-05-16 PROCEDURE — 4010F ACE/ARB THERAPY RXD/TAKEN: CPT | Mod: CPTII,S$GLB,, | Performed by: COLON & RECTAL SURGERY

## 2022-05-16 PROCEDURE — 3046F PR MOST RECENT HEMOGLOBIN A1C LEVEL > 9.0%: ICD-10-PCS | Mod: CPTII,S$GLB,, | Performed by: COLON & RECTAL SURGERY

## 2022-05-16 PROCEDURE — 4010F PR ACE/ARB THEARPY RXD/TAKEN: ICD-10-PCS | Mod: CPTII,S$GLB,, | Performed by: COLON & RECTAL SURGERY

## 2022-05-16 PROCEDURE — 99999 PR PBB SHADOW E&M-EST. PATIENT-LVL IV: ICD-10-PCS | Mod: PBBFAC,,, | Performed by: COLON & RECTAL SURGERY

## 2022-05-16 PROCEDURE — 3078F DIAST BP <80 MM HG: CPT | Mod: CPTII,S$GLB,, | Performed by: COLON & RECTAL SURGERY

## 2022-05-16 PROCEDURE — 3008F PR BODY MASS INDEX (BMI) DOCUMENTED: ICD-10-PCS | Mod: CPTII,S$GLB,, | Performed by: COLON & RECTAL SURGERY

## 2022-05-16 PROCEDURE — 99999 PR PBB SHADOW E&M-EST. PATIENT-LVL IV: CPT | Mod: PBBFAC,,, | Performed by: COLON & RECTAL SURGERY

## 2022-05-16 PROCEDURE — 1159F MED LIST DOCD IN RCRD: CPT | Mod: CPTII,S$GLB,, | Performed by: COLON & RECTAL SURGERY

## 2022-05-16 PROCEDURE — 3008F BODY MASS INDEX DOCD: CPT | Mod: CPTII,S$GLB,, | Performed by: COLON & RECTAL SURGERY

## 2022-05-16 PROCEDURE — 99213 PR OFFICE/OUTPT VISIT, EST, LEVL III, 20-29 MIN: ICD-10-PCS | Mod: S$GLB,,, | Performed by: COLON & RECTAL SURGERY

## 2022-05-16 PROCEDURE — 3046F HEMOGLOBIN A1C LEVEL >9.0%: CPT | Mod: CPTII,S$GLB,, | Performed by: COLON & RECTAL SURGERY

## 2022-05-16 PROCEDURE — 99213 OFFICE O/P EST LOW 20 MIN: CPT | Mod: S$GLB,,, | Performed by: COLON & RECTAL SURGERY

## 2022-05-16 PROCEDURE — 3077F SYST BP >= 140 MM HG: CPT | Mod: CPTII,S$GLB,, | Performed by: COLON & RECTAL SURGERY

## 2022-05-16 PROCEDURE — 3078F PR MOST RECENT DIASTOLIC BLOOD PRESSURE < 80 MM HG: ICD-10-PCS | Mod: CPTII,S$GLB,, | Performed by: COLON & RECTAL SURGERY

## 2022-05-16 NOTE — PROGRESS NOTES
Patient reports that his anal pain is much better.  He is taking his Metamucil daily and his bowel movements are much improved.  On rare occasions when he forgets to take it he will strain and see a spot of blood and have some mild discomfort.  In general he feels much better.    He will return to the office in 3 months for follow-up visit and examination

## 2022-06-01 ENCOUNTER — PATIENT MESSAGE (OUTPATIENT)
Dept: OTHER | Facility: OTHER | Age: 62
End: 2022-06-01
Payer: COMMERCIAL

## 2022-06-16 ENCOUNTER — PATIENT MESSAGE (OUTPATIENT)
Dept: OTHER | Facility: OTHER | Age: 62
End: 2022-06-16
Payer: COMMERCIAL

## 2022-06-16 PROBLEM — U07.1 COVID-19 VIRUS INFECTION: Status: ACTIVE | Noted: 2022-06-16

## 2022-07-25 ENCOUNTER — TELEPHONE (OUTPATIENT)
Dept: SURGERY | Facility: HOSPITAL | Age: 62
End: 2022-07-25
Payer: COMMERCIAL

## 2022-07-25 NOTE — TELEPHONE ENCOUNTER
Spoke to patient rescheduling his apt on 8/15 to 9/19 due to Dr Vann not being in on that day. Patient was fine with the apt change.

## 2022-09-04 ENCOUNTER — PATIENT MESSAGE (OUTPATIENT)
Dept: OTHER | Facility: OTHER | Age: 62
End: 2022-09-04
Payer: COMMERCIAL

## 2022-09-13 ENCOUNTER — TELEPHONE (OUTPATIENT)
Dept: SURGERY | Facility: CLINIC | Age: 62
End: 2022-09-13
Payer: COMMERCIAL

## 2022-09-13 NOTE — TELEPHONE ENCOUNTER
Spoke with Mr. Rollins regarding rescheduling his appointment with Dr. Vann on Monday. Appointment was rescheduled for 10/17 @3:40p in [place]. New appointment date and time was confirmed.

## 2022-09-28 ENCOUNTER — PATIENT MESSAGE (OUTPATIENT)
Dept: OTHER | Facility: OTHER | Age: 62
End: 2022-09-28
Payer: COMMERCIAL

## 2022-10-17 ENCOUNTER — OFFICE VISIT (OUTPATIENT)
Dept: SURGERY | Facility: CLINIC | Age: 62
End: 2022-10-17
Payer: COMMERCIAL

## 2022-10-17 VITALS
SYSTOLIC BLOOD PRESSURE: 138 MMHG | TEMPERATURE: 97 F | OXYGEN SATURATION: 97 % | HEART RATE: 65 BPM | WEIGHT: 290.81 LBS | HEIGHT: 76 IN | RESPIRATION RATE: 16 BRPM | BODY MASS INDEX: 35.41 KG/M2 | DIASTOLIC BLOOD PRESSURE: 81 MMHG

## 2022-10-17 DIAGNOSIS — K60.1 CHRONIC ANAL FISSURE: Primary | ICD-10-CM

## 2022-10-17 PROCEDURE — 3075F SYST BP GE 130 - 139MM HG: CPT | Mod: CPTII,S$GLB,, | Performed by: COLON & RECTAL SURGERY

## 2022-10-17 PROCEDURE — 99213 PR OFFICE/OUTPT VISIT, EST, LEVL III, 20-29 MIN: ICD-10-PCS | Mod: S$GLB,,, | Performed by: COLON & RECTAL SURGERY

## 2022-10-17 PROCEDURE — 99999 PR PBB SHADOW E&M-EST. PATIENT-LVL IV: ICD-10-PCS | Mod: PBBFAC,,, | Performed by: COLON & RECTAL SURGERY

## 2022-10-17 PROCEDURE — 1159F MED LIST DOCD IN RCRD: CPT | Mod: CPTII,S$GLB,, | Performed by: COLON & RECTAL SURGERY

## 2022-10-17 PROCEDURE — 3079F DIAST BP 80-89 MM HG: CPT | Mod: CPTII,S$GLB,, | Performed by: COLON & RECTAL SURGERY

## 2022-10-17 PROCEDURE — 3075F PR MOST RECENT SYSTOLIC BLOOD PRESS GE 130-139MM HG: ICD-10-PCS | Mod: CPTII,S$GLB,, | Performed by: COLON & RECTAL SURGERY

## 2022-10-17 PROCEDURE — 4010F PR ACE/ARB THEARPY RXD/TAKEN: ICD-10-PCS | Mod: CPTII,S$GLB,, | Performed by: COLON & RECTAL SURGERY

## 2022-10-17 PROCEDURE — 3046F PR MOST RECENT HEMOGLOBIN A1C LEVEL > 9.0%: ICD-10-PCS | Mod: CPTII,S$GLB,, | Performed by: COLON & RECTAL SURGERY

## 2022-10-17 PROCEDURE — 99213 OFFICE O/P EST LOW 20 MIN: CPT | Mod: S$GLB,,, | Performed by: COLON & RECTAL SURGERY

## 2022-10-17 PROCEDURE — 4010F ACE/ARB THERAPY RXD/TAKEN: CPT | Mod: CPTII,S$GLB,, | Performed by: COLON & RECTAL SURGERY

## 2022-10-17 PROCEDURE — 3079F PR MOST RECENT DIASTOLIC BLOOD PRESSURE 80-89 MM HG: ICD-10-PCS | Mod: CPTII,S$GLB,, | Performed by: COLON & RECTAL SURGERY

## 2022-10-17 PROCEDURE — 3046F HEMOGLOBIN A1C LEVEL >9.0%: CPT | Mod: CPTII,S$GLB,, | Performed by: COLON & RECTAL SURGERY

## 2022-10-17 PROCEDURE — 1159F PR MEDICATION LIST DOCUMENTED IN MEDICAL RECORD: ICD-10-PCS | Mod: CPTII,S$GLB,, | Performed by: COLON & RECTAL SURGERY

## 2022-10-17 PROCEDURE — 99999 PR PBB SHADOW E&M-EST. PATIENT-LVL IV: CPT | Mod: PBBFAC,,, | Performed by: COLON & RECTAL SURGERY

## 2022-12-04 NOTE — PROGRESS NOTES
"PI:  Ayo MISHEL Kimball is a 62 y.o. male with history of anal fissure    Interval hx:  Feels better.  Used diltiazem BID, taking metamucil 1-2 per day.    BMs daily.  No blood.  No pain.    Not straining       Past Medical History:   Diagnosis Date    a Elevated Cardiac CT Calcium Score     Dr. Ham Townsend; On ASA 81 Mg Daily; 10/14/21 Cardiac Nuclear Stress Test = Normal (See Report); 9/23/21 Cardiac CT Ca+ Score = 351.5 (See Report)    a H/O Facial Angioedema From Lisinopril     But He Tolerates Losartan Well    b Hypertension     b Microalbuminuria     On Losartan 25 Mg Daily    c Hypercholesterolemia With Low HDL     His Goal LDL Is < 70 (CT Ca+ And DM2); 9/27/21 RXd Crestor 20 Mg qHS And D/Cd Pravastatin (Was Ineffective); 3/24/17 Changed OTC FO To OTC Flaxseed Oil 2K Mg Bid; On Prevastatin Mg qHS    c Mild-To-Moderate Hypertriglyceridemia     c Mildly Elevated CK Level     Will Monitor    d Type 2 Diabetes Mellitus On Insulin     5/9/22 Added Trulicity Mg SQ Weekly With HgA1c In 4 Months; 11/17/17 Referred DM EDU; 10/6/17 On 70 Units Lantus SQ qHS; 8/12/16 Increased 60 Units Lantus To 70 Units SQ qHS; Has Had DM EDU With Heather And With His (Dietician) Daughter-In-Law    i COVID-19 Infection 06/16/2022    His 6/16/22 COVID-19 Home Test = Was Positive    i H/O RLL Pneumonia In 08/2018     [facility] 8/10/19-8/12/19 Stay For This    j GERD     Dr. Ender Pearson    j H/O Adenomatous Colon Polyps On Previous TC     Dr. Ender Pearson (After His 11/10/21 TC Was Free Of Polyps): "Repeat TC In 5 Years"    j H/O Anal Fissure Repair In 07/2021     Dr. Raúl Vann    j H/O Appendectomy     j Sigmoid Diverticulosis With H/O Diverticulitis     Dr. Ender lopez Benign Prostatic Hyperplasia     Dr. Júnior Adames    o H/O Nasal Septoplasty     q Borderline Vitamin D Deficiency     6/9/19 RXd OTC D3 2K IU Daily    Wellness Visit 5/9/2022         Past Surgical History:   Procedure Laterality Date    APPENDECTOMY      " COLONOSCOPY N/A 9/18/2017    Procedure: COLONOSCOPY;  Surgeon: Ender Pearson MD;  Location: [facility] ENDO;  Service: Endoscopy;  Laterality: N/A;    COLONOSCOPY N/A 11/10/2021    Procedure: COLONOSCOPY;  Surgeon: Mando Mcgarry MD;  Location: [facility] ENDO;  Service: Endoscopy;  Laterality: N/A;    INCISION AND DRAINAGE OF PERIRECTAL REGION N/A 6/19/2021    Procedure: INCISION AND DRAINAGE, PERIRECTAL REGION;  Surgeon: Sonia Rapp MD;  [facility]: [facility] OR;  Service: General;  Laterality: N/A;    NOSE SURGERY      RECTAL FISTULOTOMY N/A 7/9/2021    Procedure: FISTULOTOMY, RECTUM;  Surgeon: MAYITO Vann MD;  Location: [facility] OR;  Service: Colon and Rectal;  Laterality: N/A;       Review of patient's allergies indicates:   Allergen Reactions    Lisinopril      Other reaction(s): Facial angioedema., Facial angioedema.       Family History   Problem Relation Age of Onset    Hypertension Mother     Arthritis Mother     Prostate cancer Father        Social History     Socioeconomic History    Marital status:      Spouse name: Jya Lopez    Number of children: 3   Tobacco Use    Smoking status: Never    Smokeless tobacco: Never   Substance and Sexual Activity    Alcohol use: Yes     Alcohol/week: 1.0 standard drink     Types: 1 Cans of beer per week     Comment: socially     Drug use: No    Sexual activity: Yes     Partners: Female       ROS:  GENERAL: No fever, chills, fatigability or weight loss.  Integument: No rashes, redness, icterus  CHEST: Denies HALLMAN, cyanosis, wheezing, cough and sputum production.  CARDIOVASCULAR: Denies chest pain, PND, orthopnea or reduced exercise tolerance.  GI: Denies abd pain, dysphagia, nausea, vomiting, no hematemesis   : Denies burning on urination, no hematuria, no bacteriuria  MSK: No deformities, swelling, joint pain swelling  Neurologic: No HAs, seizures, weakness, paresthesias, gait problems    PE:  General appearance well  /66   Pulse 64   Temp 97.6 °F  (36.4 °C) (Oral)   Wt 132.1 kg (291 lb 1.9 oz)   SpO2 98%   BMI 35.44 kg/m²     Sclera/ Skin anicteric  AT NC EOMI  Neck supple trachea midline   Chest symmetric, nl excursion, no retractions, breathing comfortably  EXT - no CCE  Neuro:  Mood/ affect nl, alert and oriented x 3, moves all ext's, gait nl    Rectal  Inspection     Anal fissure healed  TAMMIE nl tone, no mass, good squeeze, nl relaxation with Valsalva      Assessment:  Anal fissure healed  BMs better with fiber supplement    Plan:  Stop diltiazem crea  High fiber diet - 25 grams per day.  Emphasis on whole grain breads such as double fiber wheat bread and high fiber cereals and bars.    Drink 8 glasses of water per day 64 - 96 oz per day  Fiber supplements such as KONSYL, METAMUCIL can be taken 1-2 x per day  Regular exercise - 30 min brisk walking 5 days per week  RTC prn

## 2022-12-05 ENCOUNTER — OFFICE VISIT (OUTPATIENT)
Dept: SURGERY | Facility: CLINIC | Age: 62
End: 2022-12-05
Payer: COMMERCIAL

## 2022-12-05 VITALS
TEMPERATURE: 98 F | SYSTOLIC BLOOD PRESSURE: 138 MMHG | HEART RATE: 64 BPM | WEIGHT: 291.13 LBS | BODY MASS INDEX: 35.44 KG/M2 | OXYGEN SATURATION: 98 % | DIASTOLIC BLOOD PRESSURE: 66 MMHG

## 2022-12-05 DIAGNOSIS — K60.1 CHRONIC ANAL FISSURE: Primary | ICD-10-CM

## 2022-12-05 PROCEDURE — 3075F SYST BP GE 130 - 139MM HG: CPT | Mod: CPTII,S$GLB,, | Performed by: COLON & RECTAL SURGERY

## 2022-12-05 PROCEDURE — 99213 PR OFFICE/OUTPT VISIT, EST, LEVL III, 20-29 MIN: ICD-10-PCS | Mod: S$GLB,,, | Performed by: COLON & RECTAL SURGERY

## 2022-12-05 PROCEDURE — 4010F PR ACE/ARB THEARPY RXD/TAKEN: ICD-10-PCS | Mod: CPTII,S$GLB,, | Performed by: COLON & RECTAL SURGERY

## 2022-12-05 PROCEDURE — 99999 PR PBB SHADOW E&M-EST. PATIENT-LVL IV: ICD-10-PCS | Mod: PBBFAC,,, | Performed by: COLON & RECTAL SURGERY

## 2022-12-05 PROCEDURE — 99999 PR PBB SHADOW E&M-EST. PATIENT-LVL IV: CPT | Mod: PBBFAC,,, | Performed by: COLON & RECTAL SURGERY

## 2022-12-05 PROCEDURE — 3008F BODY MASS INDEX DOCD: CPT | Mod: CPTII,S$GLB,, | Performed by: COLON & RECTAL SURGERY

## 2022-12-05 PROCEDURE — 3075F PR MOST RECENT SYSTOLIC BLOOD PRESS GE 130-139MM HG: ICD-10-PCS | Mod: CPTII,S$GLB,, | Performed by: COLON & RECTAL SURGERY

## 2022-12-05 PROCEDURE — 3046F PR MOST RECENT HEMOGLOBIN A1C LEVEL > 9.0%: ICD-10-PCS | Mod: CPTII,S$GLB,, | Performed by: COLON & RECTAL SURGERY

## 2022-12-05 PROCEDURE — 3008F PR BODY MASS INDEX (BMI) DOCUMENTED: ICD-10-PCS | Mod: CPTII,S$GLB,, | Performed by: COLON & RECTAL SURGERY

## 2022-12-05 PROCEDURE — 3078F DIAST BP <80 MM HG: CPT | Mod: CPTII,S$GLB,, | Performed by: COLON & RECTAL SURGERY

## 2022-12-05 PROCEDURE — 1159F MED LIST DOCD IN RCRD: CPT | Mod: CPTII,S$GLB,, | Performed by: COLON & RECTAL SURGERY

## 2022-12-05 PROCEDURE — 99213 OFFICE O/P EST LOW 20 MIN: CPT | Mod: S$GLB,,, | Performed by: COLON & RECTAL SURGERY

## 2022-12-05 PROCEDURE — 4010F ACE/ARB THERAPY RXD/TAKEN: CPT | Mod: CPTII,S$GLB,, | Performed by: COLON & RECTAL SURGERY

## 2022-12-05 PROCEDURE — 1159F PR MEDICATION LIST DOCUMENTED IN MEDICAL RECORD: ICD-10-PCS | Mod: CPTII,S$GLB,, | Performed by: COLON & RECTAL SURGERY

## 2022-12-05 PROCEDURE — 3078F PR MOST RECENT DIASTOLIC BLOOD PRESSURE < 80 MM HG: ICD-10-PCS | Mod: CPTII,S$GLB,, | Performed by: COLON & RECTAL SURGERY

## 2022-12-05 PROCEDURE — 3046F HEMOGLOBIN A1C LEVEL >9.0%: CPT | Mod: CPTII,S$GLB,, | Performed by: COLON & RECTAL SURGERY
